# Patient Record
Sex: FEMALE | Race: WHITE | NOT HISPANIC OR LATINO
[De-identification: names, ages, dates, MRNs, and addresses within clinical notes are randomized per-mention and may not be internally consistent; named-entity substitution may affect disease eponyms.]

---

## 2017-06-20 PROBLEM — Z00.00 ENCOUNTER FOR PREVENTIVE HEALTH EXAMINATION: Status: ACTIVE | Noted: 2017-06-20

## 2017-09-05 ENCOUNTER — APPOINTMENT (OUTPATIENT)
Dept: OTOLARYNGOLOGY | Facility: CLINIC | Age: 72
End: 2017-09-05
Payer: MEDICARE

## 2017-09-05 VITALS
BODY MASS INDEX: 36.82 KG/M2 | HEART RATE: 84 BPM | WEIGHT: 221 LBS | DIASTOLIC BLOOD PRESSURE: 100 MMHG | SYSTOLIC BLOOD PRESSURE: 171 MMHG | HEIGHT: 65 IN

## 2017-09-05 DIAGNOSIS — F41.9 ANXIETY DISORDER, UNSPECIFIED: ICD-10-CM

## 2017-09-05 DIAGNOSIS — E16.2 HYPOGLYCEMIA, UNSPECIFIED: ICD-10-CM

## 2017-09-05 DIAGNOSIS — R09.82 POSTNASAL DRIP: ICD-10-CM

## 2017-09-05 DIAGNOSIS — I10 ESSENTIAL (PRIMARY) HYPERTENSION: ICD-10-CM

## 2017-09-05 DIAGNOSIS — E03.9 HYPOTHYROIDISM, UNSPECIFIED: ICD-10-CM

## 2017-09-05 DIAGNOSIS — H72.91 UNSPECIFIED PERFORATION OF TYMPANIC MEMBRANE, RIGHT EAR: ICD-10-CM

## 2017-09-05 DIAGNOSIS — J45.909 UNSPECIFIED ASTHMA, UNCOMPLICATED: ICD-10-CM

## 2017-09-05 PROCEDURE — 92550 TYMPANOMETRY & REFLEX THRESH: CPT

## 2017-09-05 PROCEDURE — 31231 NASAL ENDOSCOPY DX: CPT

## 2017-09-05 PROCEDURE — 92557 COMPREHENSIVE HEARING TEST: CPT

## 2017-09-05 PROCEDURE — 99204 OFFICE O/P NEW MOD 45 MIN: CPT | Mod: 25

## 2017-09-08 PROBLEM — R09.82 POSTNASAL DRIP: Status: ACTIVE | Noted: 2017-09-08

## 2017-09-08 PROBLEM — E16.2 HYPOGLYCEMIA: Status: ACTIVE | Noted: 2017-09-05

## 2017-09-08 PROBLEM — H72.91 PERFORATION OF RIGHT TYMPANIC MEMBRANE: Status: ACTIVE | Noted: 2017-09-08

## 2017-09-08 PROBLEM — I10 HYPERTENSION, UNSPECIFIED TYPE: Status: ACTIVE | Noted: 2017-09-05

## 2017-09-08 PROBLEM — E03.9 HYPOTHYROIDISM: Status: ACTIVE | Noted: 2017-09-05

## 2017-09-08 PROBLEM — J45.909 ASTHMA: Status: ACTIVE | Noted: 2017-09-05

## 2017-09-08 PROBLEM — F41.9 ANXIETY: Status: ACTIVE | Noted: 2017-09-05

## 2018-03-06 ENCOUNTER — APPOINTMENT (OUTPATIENT)
Dept: OTOLARYNGOLOGY | Facility: CLINIC | Age: 73
End: 2018-03-06

## 2018-06-12 ENCOUNTER — APPOINTMENT (OUTPATIENT)
Dept: OTOLARYNGOLOGY | Facility: CLINIC | Age: 73
End: 2018-06-12
Payer: MEDICARE

## 2018-06-12 VITALS
WEIGHT: 189 LBS | DIASTOLIC BLOOD PRESSURE: 80 MMHG | HEIGHT: 65 IN | BODY MASS INDEX: 31.49 KG/M2 | SYSTOLIC BLOOD PRESSURE: 145 MMHG | HEART RATE: 78 BPM

## 2018-06-12 PROCEDURE — 99214 OFFICE O/P EST MOD 30 MIN: CPT

## 2018-06-12 PROCEDURE — 92550 TYMPANOMETRY & REFLEX THRESH: CPT

## 2018-06-12 PROCEDURE — 92557 COMPREHENSIVE HEARING TEST: CPT

## 2018-06-29 ENCOUNTER — APPOINTMENT (OUTPATIENT)
Dept: OTOLARYNGOLOGY | Facility: CLINIC | Age: 73
End: 2018-06-29
Payer: MEDICARE

## 2018-06-29 PROCEDURE — 92540 BASIC VESTIBULAR EVALUATION: CPT

## 2018-06-29 PROCEDURE — 92537 CALORIC VSTBLR TEST W/REC: CPT

## 2018-07-20 ENCOUNTER — APPOINTMENT (OUTPATIENT)
Dept: OTOLARYNGOLOGY | Facility: CLINIC | Age: 73
End: 2018-07-20
Payer: MEDICARE

## 2018-07-20 VITALS
DIASTOLIC BLOOD PRESSURE: 72 MMHG | SYSTOLIC BLOOD PRESSURE: 146 MMHG | HEART RATE: 85 BPM | WEIGHT: 190 LBS | BODY MASS INDEX: 31.65 KG/M2 | HEIGHT: 65 IN

## 2018-07-20 DIAGNOSIS — R42 DIZZINESS AND GIDDINESS: ICD-10-CM

## 2018-07-20 DIAGNOSIS — H81.90 UNSPECIFIED DISORDER OF VESTIBULAR FUNCTION, UNSPECIFIED EAR: ICD-10-CM

## 2018-07-20 PROCEDURE — 99213 OFFICE O/P EST LOW 20 MIN: CPT

## 2018-12-18 ENCOUNTER — APPOINTMENT (OUTPATIENT)
Dept: OTOLARYNGOLOGY | Facility: CLINIC | Age: 73
End: 2018-12-18

## 2019-02-12 ENCOUNTER — RX RENEWAL (OUTPATIENT)
Age: 74
End: 2019-02-12

## 2019-02-12 RX ORDER — CIPROFLOXACIN AND DEXAMETHASONE 3; 1 MG/ML; MG/ML
0.3-0.1 SUSPENSION/ DROPS AURICULAR (OTIC)
Qty: 1 | Refills: 2 | Status: ACTIVE | COMMUNITY
Start: 2017-09-05 | End: 1900-01-01

## 2019-07-17 ENCOUNTER — TRANSCRIPTION ENCOUNTER (OUTPATIENT)
Age: 74
End: 2019-07-17

## 2021-01-21 ENCOUNTER — NON-APPOINTMENT (OUTPATIENT)
Age: 76
End: 2021-01-21

## 2021-02-02 ENCOUNTER — APPOINTMENT (OUTPATIENT)
Dept: OTOLARYNGOLOGY | Facility: CLINIC | Age: 76
End: 2021-02-02

## 2021-02-05 ENCOUNTER — NON-APPOINTMENT (OUTPATIENT)
Age: 76
End: 2021-02-05

## 2021-02-25 ENCOUNTER — APPOINTMENT (OUTPATIENT)
Dept: OTOLARYNGOLOGY | Facility: CLINIC | Age: 76
End: 2021-02-25
Payer: MEDICARE

## 2021-02-25 ENCOUNTER — NON-APPOINTMENT (OUTPATIENT)
Age: 76
End: 2021-02-25

## 2021-02-25 VITALS
WEIGHT: 218 LBS | HEART RATE: 89 BPM | TEMPERATURE: 97.9 F | BODY MASS INDEX: 36.32 KG/M2 | DIASTOLIC BLOOD PRESSURE: 87 MMHG | HEIGHT: 65 IN | SYSTOLIC BLOOD PRESSURE: 126 MMHG

## 2021-02-25 PROCEDURE — 99212 OFFICE O/P EST SF 10 MIN: CPT

## 2021-02-25 NOTE — HISTORY OF PRESENT ILLNESS
[de-identified] : 75F here in followup.\par \par Last seen 7/2018.\par She c/o right ear discomfort, decreased right sided hearing and some mucoid drainage. Additionally, she c/o occasional popping sounds in her left ear.\par   \par She has h/o chronic otomastoiditis and s/p right tympanomastoidectomy 2004. Since then, her frequency of ear infections has decreased, but her hearing has also slowly diminished as well. \par \par She also has chronic postnasal drip, mild allergies (dust, pollen). Occasional flonase. Had ESS in 2001. No recent sinus infections. At baseline, left>right nasal congestion, but otherwise no other complaints.\par \par Most recent audiogram improved compared to prior (smaller air-bone gaps at all frequencies):\par R ear: moderate rising to mild to moderate conductive hearing loss with air bone gaps ranging from 10-30dB all freq. SRT 35, 100% discrim. type B\par L ear: hearing wnl through 3khz sloping to mild to moderate SNHL thereafter. SRT 15, 100% discrim, type Ad\par \par ROS otherwise unremarkable.

## 2021-02-25 NOTE — CONSULT LETTER
[Dear  ___] : Dear  [unfilled], [Courtesy Letter:] : I had the pleasure of seeing your patient, [unfilled], in my office today. [Consult Closing:] : Thank you very much for allowing me to participate in the care of this patient.  If you have any questions, please do not hesitate to contact me. [Sincerely,] : Sincerely, [DrDarell  ___] : Dr. GONZALEZ [Theo Denton MD] : Theo Denton MD  [Department of Otolaryngology, Head and Neck Surgery] : Department of Otolaryngology, Head and Neck Surgery [[facility]] : [facility]

## 2021-02-25 NOTE — ASSESSMENT
[FreeTextEntry1] : 75F here in followup. She was last seen 7/2018.\par Over the past few weeks, she c/o right ear discomfort, decreased right sided hearing and some mucoid drainage. Additionally, she c/o occasional popping sounds in her left ear.\par She has a h/o chronic otomastoiditis and is s/p right tympanomastoidectomy 2004. Most recent audiogram from 6/2018 shows essentially mild to moderate right sided conductive hearing loss and left sided mild to moderate high frequency sensorineural hearing loss. On exam, there mucoid debris sitting in the right middle ear space through the perforation which was suctioned free; the middle ear mucosa is only mildly hyperemia but patent w no edema. The left TM intact and slightly retracted but mobile. \par Right sided ear sx likely due to the mucoid debris in the middle ear, which was removed. Left sided ear sx due to eustachian tube dysfunction. Can retry the nasal steroid sprays for now. RTO 6 months for repeat audiogram.\par

## 2021-02-25 NOTE — DATA REVIEWED
[de-identified] : From today:\par R ear: moderate rising to mild to moderate conductive hearing loss with air bone gaps ranging from 10-30dB all freq. SRT 35, 100% discrim. type B\par L ear: hearing wnl through 3khz sloping to mild to moderate SNHL thereafter. SRT 15, 100% discrim, type Ad\par \par From 9/2017:\par R ear: moderate rising to mild CHL with air bone gaps ranging from 20-40dB all freq. SRT 45, 100% discrim. type B\par L ear: hearing wnl followed by high freq mild SNHL >3khz. SRT 20, 100% discrim, type Ad\par  [de-identified] : \par She is here to review vestibular testing from 6/29/18 c/w peripheral vestibulopathy:\par -caloric reduced vestibular response by 52% in left ear\par -left beating nystagmus 52% stronger in left ear\par -right beating nystagmus noted in head left and body left position\par -bithermal caloric testing revealed unilateral weakness from the left\par

## 2021-02-25 NOTE — PROCEDURE
[FreeTextEntry3] : Nasal Endoscopy (from prior visit):\par e/o prior right miguel-antrostomy, partial middle turbinectomy \par superior right septal deviation\par no mucopus or polyps, right ethmoidectomy patent

## 2022-03-10 ENCOUNTER — NON-APPOINTMENT (OUTPATIENT)
Age: 77
End: 2022-03-10

## 2022-03-22 ENCOUNTER — APPOINTMENT (OUTPATIENT)
Dept: OTOLARYNGOLOGY | Facility: CLINIC | Age: 77
End: 2022-03-22
Payer: MEDICARE

## 2022-03-22 VITALS
HEIGHT: 65 IN | DIASTOLIC BLOOD PRESSURE: 88 MMHG | SYSTOLIC BLOOD PRESSURE: 138 MMHG | BODY MASS INDEX: 35.32 KG/M2 | HEART RATE: 69 BPM | TEMPERATURE: 97 F | WEIGHT: 212 LBS

## 2022-03-22 DIAGNOSIS — H69.81 OTHER SPECIFIED DISORDERS OF EUSTACHIAN TUBE, RIGHT EAR: ICD-10-CM

## 2022-03-22 DIAGNOSIS — H66.90 OTITIS MEDIA, UNSPECIFIED, UNSPECIFIED EAR: ICD-10-CM

## 2022-03-22 PROCEDURE — 99214 OFFICE O/P EST MOD 30 MIN: CPT

## 2022-03-22 PROCEDURE — 92550 TYMPANOMETRY & REFLEX THRESH: CPT

## 2022-03-22 PROCEDURE — 92557 COMPREHENSIVE HEARING TEST: CPT

## 2022-03-23 PROBLEM — H69.81 EUSTACHIAN TUBE DYSFUNCTION, RIGHT: Status: ACTIVE | Noted: 2017-09-08

## 2022-03-23 PROBLEM — H66.90 CHRONIC OTITIS MEDIA: Status: ACTIVE | Noted: 2017-09-08

## 2022-03-23 NOTE — PHYSICAL EXAM
[Midline] : trachea located in midline position [Normal] : no rashes [Nystagmus] : ~T no ~M nystagmus was seen [[place]] : [place]: Negative [FreeTextEntry1] : Ad: well healed postauricular incision. EAC clear; ~40% posterior perforation, dry, Middle ear clear and mucosa normal, no drainage \par As: eac clear and dry, TM intact, hypermobile, ME clear.

## 2022-03-23 NOTE — HISTORY OF PRESENT ILLNESS
[de-identified] : 76F here in followup.\par \par Last seen 2/2021.\par She c/o occasional left ear discomfort and sensation it is clogged. There is also right ear discomfort. There is no otorrhea, tinnitus or vertigo.\par She has h/o chronic otomastoiditis and s/p right tympanomastoidectomy 2004. Since then, her frequency of ear infections has decreased, but her hearing has also slowly diminished as well. \par \par She also has chronic postnasal drip, mild allergies (dust, pollen). Occasional flonase. Had ESS in 2001. No recent sinus infections. At baseline, left>right nasal congestion, but otherwise no other complaints.\par \par Audiogram from today (essentially stable relative to prior testing 2/2021):\par R ear: moderate rising to mild to moderately severe mixed hearing loss with air bone gaps through 3khz, ranging from 10-30dB. SRT 45, 100% discrim. type B\par L ear: hearing wnl through 3khz sloping to mild SNHL thereafter. SRT 20, 100% discrim, type Ad\par \par ROS otherwise unremarkable.

## 2022-03-23 NOTE — DATA REVIEWED
[de-identified] : From today:\par R ear: moderate rising to mild to moderate conductive hearing loss with air bone gaps ranging from 10-30dB all freq. SRT 35, 100% discrim. type B\par L ear: hearing wnl through 3khz sloping to mild to moderate SNHL thereafter. SRT 15, 100% discrim, type Ad\par \par From 9/2017:\par R ear: moderate rising to mild CHL with air bone gaps ranging from 20-40dB all freq. SRT 45, 100% discrim. type B\par L ear: hearing wnl followed by high freq mild SNHL >3khz. SRT 20, 100% discrim, type Ad\par  [de-identified] : \par She is here to review vestibular testing from 6/29/18 c/w peripheral vestibulopathy:\par -caloric reduced vestibular response by 52% in left ear\par -left beating nystagmus 52% stronger in left ear\par -right beating nystagmus noted in head left and body left position\par -bithermal caloric testing revealed unilateral weakness from the left\par

## 2022-03-23 NOTE — CONSULT LETTER
[Dear  ___] : Dear  [unfilled], [Courtesy Letter:] : I had the pleasure of seeing your patient, [unfilled], in my office today. [Consult Closing:] : Thank you very much for allowing me to participate in the care of this patient.  If you have any questions, please do not hesitate to contact me. [Sincerely,] : Sincerely, [DrDarell  ___] : Dr. GONZALEZ [Theo Denton MD] : Theo Denton MD  [Department of Otolaryngology, Head and Neck Surgery] : Department of Otolaryngology, Head and Neck Surgery [[facility]] : [facility]

## 2022-03-23 NOTE — ASSESSMENT
[FreeTextEntry1] : 76F here in followup. She was last seen 2/2021.\par She c/o occasional left ear discomfort and sensation it is clogged. There is also right ear discomfort. There is no otorrhea, tinnitus or vertigo. She has h/o chronic otomastoiditis and s/p right tympanomastoidectomy 2004. Since then, her frequency of ear infections has decreased, but her hearing has also slowly diminished as well. She also has chronic postnasal drip, mild allergies (dust, pollen). She uses occasional flonase. She had ESS in 2001 and has had no recent sinus infections. Audiogram from today is essentially stable relative to prior testing 2/2021 (right ear slightly worse) as detailed above. On exam, there is a stable right TM perforation which is dry. The left TM is intact and hypermobile. \par Exam is unchanged and audiometry not much different. 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 She can continue nasal steroid sprays for now. No further intervention. RTO 6 months for repeat audiogram.\par

## 2023-03-23 ENCOUNTER — APPOINTMENT (OUTPATIENT)
Dept: OTOLARYNGOLOGY | Facility: CLINIC | Age: 78
End: 2023-03-23

## 2024-01-13 ENCOUNTER — NON-APPOINTMENT (OUTPATIENT)
Age: 79
End: 2024-01-13

## 2024-04-30 ENCOUNTER — NON-APPOINTMENT (OUTPATIENT)
Age: 79
End: 2024-04-30

## 2025-04-23 ENCOUNTER — NON-APPOINTMENT (OUTPATIENT)
Age: 80
End: 2025-04-23